# Patient Record
Sex: MALE | Race: WHITE
[De-identification: names, ages, dates, MRNs, and addresses within clinical notes are randomized per-mention and may not be internally consistent; named-entity substitution may affect disease eponyms.]

---

## 2023-04-19 ENCOUNTER — HOSPITAL ENCOUNTER (INPATIENT)
Dept: HOSPITAL 95 - ER | Age: 73
LOS: 7 days | Discharge: HOME | DRG: 329 | End: 2023-04-26
Attending: INTERNAL MEDICINE | Admitting: HOSPITALIST
Payer: MEDICARE

## 2023-04-19 VITALS — WEIGHT: 209 LBS | HEIGHT: 69 IN | BODY MASS INDEX: 30.96 KG/M2

## 2023-04-19 DIAGNOSIS — K66.1: ICD-10-CM

## 2023-04-19 DIAGNOSIS — K56.50: Primary | ICD-10-CM

## 2023-04-19 DIAGNOSIS — Z98.890: ICD-10-CM

## 2023-04-19 DIAGNOSIS — E78.5: ICD-10-CM

## 2023-04-19 DIAGNOSIS — I10: ICD-10-CM

## 2023-04-19 DIAGNOSIS — R33.8: ICD-10-CM

## 2023-04-19 DIAGNOSIS — N40.1: ICD-10-CM

## 2023-04-19 DIAGNOSIS — Z86.19: ICD-10-CM

## 2023-04-19 DIAGNOSIS — Z79.890: ICD-10-CM

## 2023-04-19 DIAGNOSIS — Z79.899: ICD-10-CM

## 2023-04-19 DIAGNOSIS — E03.9: ICD-10-CM

## 2023-04-19 LAB
ALBUMIN SERPL BCP-MCNC: 4.3 G/DL (ref 3.4–5)
ALBUMIN/GLOB SERPL: 1.2 {RATIO} (ref 0.8–1.8)
ALT SERPL W P-5'-P-CCNC: 36 U/L (ref 12–78)
ANION GAP SERPL CALCULATED.4IONS-SCNC: 3 MMOL/L (ref 6–16)
AST SERPL W P-5'-P-CCNC: 41 U/L (ref 12–37)
BASOPHILS # BLD AUTO: 0.04 K/MM3 (ref 0–0.23)
BASOPHILS NFR BLD AUTO: 0 % (ref 0–2)
BILIRUB SERPL-MCNC: 1.1 MG/DL (ref 0.1–1)
BUN SERPL-MCNC: 17 MG/DL (ref 8–24)
CALCIUM SERPL-MCNC: 9.5 MG/DL (ref 8.5–10.1)
CHLORIDE SERPL-SCNC: 107 MMOL/L (ref 98–108)
CO2 SERPL-SCNC: 26 MMOL/L (ref 21–32)
CREAT SERPL-MCNC: 0.84 MG/DL (ref 0.6–1.2)
DEPRECATED RDW RBC AUTO: 39.8 FL (ref 35.1–46.3)
EOSINOPHIL # BLD AUTO: 0 K/MM3 (ref 0–0.68)
EOSINOPHIL NFR BLD AUTO: 0 % (ref 0–6)
ERYTHROCYTE [DISTWIDTH] IN BLOOD BY AUTOMATED COUNT: 12 % (ref 11.7–14.2)
GLOBULIN SER CALC-MCNC: 3.6 G/DL (ref 2.2–4)
GLUCOSE SERPL-MCNC: 151 MG/DL (ref 70–99)
HCT VFR BLD AUTO: 50.5 % (ref 37–53)
HGB BLD-MCNC: 17.3 G/DL (ref 13.5–17.5)
IMM GRANULOCYTES # BLD AUTO: 0.04 K/MM3 (ref 0–0.1)
IMM GRANULOCYTES NFR BLD AUTO: 0 % (ref 0–1)
LYMPHOCYTES # BLD AUTO: 0.78 K/MM3 (ref 0.84–5.2)
LYMPHOCYTES NFR BLD AUTO: 7 % (ref 21–46)
MCHC RBC AUTO-ENTMCNC: 34.3 G/DL (ref 31.5–36.5)
MCV RBC AUTO: 91 FL (ref 80–100)
MONOCYTES # BLD AUTO: 0.53 K/MM3 (ref 0.16–1.47)
MONOCYTES NFR BLD AUTO: 4 % (ref 4–13)
NEUTROPHILS # BLD AUTO: 10.64 K/MM3 (ref 1.96–9.15)
NEUTROPHILS NFR BLD AUTO: 89 % (ref 41–73)
NRBC # BLD AUTO: 0 K/MM3 (ref 0–0.02)
NRBC BLD AUTO-RTO: 0 /100 WBC (ref 0–0.2)
PLATELET # BLD AUTO: 206 K/MM3 (ref 150–400)
POTASSIUM SERPL-SCNC: 4.8 MMOL/L (ref 3.5–5.5)
PROT SERPL-MCNC: 7.9 G/DL (ref 6.4–8.2)
SODIUM SERPL-SCNC: 136 MMOL/L (ref 136–145)

## 2023-04-19 PROCEDURE — A9270 NON-COVERED ITEM OR SERVICE: HCPCS

## 2023-04-20 VITALS — DIASTOLIC BLOOD PRESSURE: 68 MMHG | SYSTOLIC BLOOD PRESSURE: 145 MMHG

## 2023-04-20 VITALS — DIASTOLIC BLOOD PRESSURE: 83 MMHG | SYSTOLIC BLOOD PRESSURE: 155 MMHG

## 2023-04-20 VITALS — DIASTOLIC BLOOD PRESSURE: 69 MMHG | SYSTOLIC BLOOD PRESSURE: 151 MMHG

## 2023-04-20 VITALS — SYSTOLIC BLOOD PRESSURE: 134 MMHG | DIASTOLIC BLOOD PRESSURE: 81 MMHG

## 2023-04-20 VITALS — SYSTOLIC BLOOD PRESSURE: 145 MMHG | DIASTOLIC BLOOD PRESSURE: 80 MMHG

## 2023-04-20 VITALS — SYSTOLIC BLOOD PRESSURE: 145 MMHG | DIASTOLIC BLOOD PRESSURE: 73 MMHG

## 2023-04-20 VITALS — DIASTOLIC BLOOD PRESSURE: 73 MMHG | SYSTOLIC BLOOD PRESSURE: 151 MMHG

## 2023-04-20 VITALS — DIASTOLIC BLOOD PRESSURE: 55 MMHG | SYSTOLIC BLOOD PRESSURE: 109 MMHG

## 2023-04-20 VITALS — SYSTOLIC BLOOD PRESSURE: 141 MMHG | DIASTOLIC BLOOD PRESSURE: 68 MMHG

## 2023-04-20 VITALS — SYSTOLIC BLOOD PRESSURE: 157 MMHG | DIASTOLIC BLOOD PRESSURE: 76 MMHG

## 2023-04-20 VITALS — DIASTOLIC BLOOD PRESSURE: 72 MMHG | SYSTOLIC BLOOD PRESSURE: 146 MMHG

## 2023-04-20 VITALS — SYSTOLIC BLOOD PRESSURE: 145 MMHG | DIASTOLIC BLOOD PRESSURE: 68 MMHG

## 2023-04-20 VITALS — DIASTOLIC BLOOD PRESSURE: 74 MMHG | SYSTOLIC BLOOD PRESSURE: 133 MMHG

## 2023-04-20 VITALS — DIASTOLIC BLOOD PRESSURE: 71 MMHG | SYSTOLIC BLOOD PRESSURE: 145 MMHG

## 2023-04-20 VITALS — SYSTOLIC BLOOD PRESSURE: 142 MMHG | DIASTOLIC BLOOD PRESSURE: 86 MMHG

## 2023-04-20 VITALS — SYSTOLIC BLOOD PRESSURE: 135 MMHG | DIASTOLIC BLOOD PRESSURE: 65 MMHG

## 2023-04-20 PROCEDURE — 0DB80ZZ EXCISION OF SMALL INTESTINE, OPEN APPROACH: ICD-10-PCS | Performed by: SURGERY

## 2023-04-20 NOTE — NUR
PATIENT IS A NEW ADMIT FROM THE ED. AXOX 4 AND INDEPENDENT TRANSFER FROM
[facility] TO BED. NPO. NG TUBE PLACED IN ED AND HOOKED UP TO SUCTION. REPORTS
TOLERABLE ABDOMINAL PAIN 3/10. DENIES CHEST PAIN, SOB, AND N/V. ON ROOM AIR.
VSS/AFEBRILE. SPOUSE PRESENT ON ADMIT AND REQUEST TO STAY NOT WANTING TO DRIVE
IN DARK. BLANKET AND PILLOW PROVIDED. PATIENT ORIENTED TO ROOM AND CALL LIGHT
SYSTEM. NS STARTED INFUSING  mL/HR. CALL LIGHT IN REACH. BED IN LOWEST
POSITION. WCTM.

## 2023-04-20 NOTE — NUR
SURGERY
DR ROJSA HERE. PT VERBALLY CONSNETED TO SURGERY. WIFE ARRIVED. BLOOD CONSENT
SIGNED. FARIDEH,,JAHAIRA RM FROM DAY SURGERY HERE TO TRANSPORT PT TO SURGERY. CONTINUE
POC.

## 2023-04-20 NOTE — NUR
PT ARRIVED FROM PACU, A&OX4, VSS/2LNC, SCOOTED FROM GURNEY TO BED W/O ASSIST,
DENIES PAIN, DENIES N&V, WIFE AT BEDSIDE.

## 2023-04-20 NOTE — NUR
SHIFT SUMMARY
PT A&OX4, VSS/2L-TCDB & I.S. EDU/ENC/DEMONSTRATED. KATY PO, ICE CHIPS 60 MLS
POSTOP. PAIN MANAGED NORCO 5 MG X1. VOIDED 100 MLS. IVF @ 125 MLS/HR.
S/P EXP LAP, STACI, SB RESECTION, JASMEET WNL. WILL REPORT TO ONCOMING NOC RN.

## 2023-04-21 VITALS — DIASTOLIC BLOOD PRESSURE: 74 MMHG | SYSTOLIC BLOOD PRESSURE: 151 MMHG

## 2023-04-21 VITALS — DIASTOLIC BLOOD PRESSURE: 69 MMHG | SYSTOLIC BLOOD PRESSURE: 153 MMHG

## 2023-04-21 VITALS — SYSTOLIC BLOOD PRESSURE: 159 MMHG | DIASTOLIC BLOOD PRESSURE: 75 MMHG

## 2023-04-21 VITALS — DIASTOLIC BLOOD PRESSURE: 69 MMHG | SYSTOLIC BLOOD PRESSURE: 155 MMHG

## 2023-04-21 LAB
ANION GAP SERPL CALCULATED.4IONS-SCNC: 2 MMOL/L (ref 6–16)
BUN SERPL-MCNC: 27 MG/DL (ref 8–24)
CALCIUM SERPL-MCNC: 8.3 MG/DL (ref 8.5–10.1)
CHLORIDE SERPL-SCNC: 112 MMOL/L (ref 98–108)
CO2 SERPL-SCNC: 25 MMOL/L (ref 21–32)
CREAT SERPL-MCNC: 0.93 MG/DL (ref 0.6–1.2)
DEPRECATED RDW RBC AUTO: 43.8 FL (ref 35.1–46.3)
ERYTHROCYTE [DISTWIDTH] IN BLOOD BY AUTOMATED COUNT: 12.6 % (ref 11.7–14.2)
GLUCOSE SERPL-MCNC: 132 MG/DL (ref 70–99)
HCT VFR BLD AUTO: 44.6 % (ref 37–53)
HGB BLD-MCNC: 14.9 G/DL (ref 13.5–17.5)
MCHC RBC AUTO-ENTMCNC: 33.4 G/DL (ref 31.5–36.5)
MCV RBC AUTO: 94 FL (ref 80–100)
NRBC # BLD AUTO: 0 K/MM3 (ref 0–0.02)
NRBC BLD AUTO-RTO: 0 /100 WBC (ref 0–0.2)
PLATELET # BLD AUTO: 172 K/MM3 (ref 150–400)
POTASSIUM SERPL-SCNC: 4.4 MMOL/L (ref 3.5–5.5)
SODIUM SERPL-SCNC: 139 MMOL/L (ref 136–145)

## 2023-04-21 NOTE — NUR
SUMMARY
INDEPENDENT IN ROOM, AMBULATED DOWN THE HALLS X3 TODAY, TOLERATED WELL, DENIES
PASSING ANY FLATUS TODAY, MEDICATED ONCE FOR PAIN W/ 1 NORCO, NO C/O NAUSEA,
TOLERATING SIPS OF WATER AND ICE CHIPS WELL, NO ACUTE CHANGES THIS SHIFT.

## 2023-04-21 NOTE — NUR
SHIFT SUMMARY
POD1 EX LAP, JASMEET DRESSING TO MIDLINE ABD, SHOWS SLIGHT SHADOWING IN CENTER.
PATIENT IS AOX4, SBA IN ROOM. VOIDING IN URINAL. REPORTS NO FLATUS OF BM.
HAVING MILD PAIN MEDICATED PER EMAR. TOLERATES WELL. VITALS SIGNS WNL. CALL
LIGHT IN REACH. WILL REPORT TO DAY RN.

## 2023-04-21 NOTE — NUR
DENIES ANY NEED FOR PAIN MEDS AT THIS TIME, DENIES ANY NAUSEA, TOLERATING SIPS
AND ICE CHIPS WELL, AMBULATING INDEPENDENTLY, USING IS, DENIES PASSING FLATUS,
CONT. TO MONITOR FOR ANY CHANGES.

## 2023-04-22 VITALS — SYSTOLIC BLOOD PRESSURE: 146 MMHG | DIASTOLIC BLOOD PRESSURE: 67 MMHG

## 2023-04-22 VITALS — SYSTOLIC BLOOD PRESSURE: 159 MMHG | DIASTOLIC BLOOD PRESSURE: 78 MMHG

## 2023-04-22 VITALS — DIASTOLIC BLOOD PRESSURE: 72 MMHG | SYSTOLIC BLOOD PRESSURE: 145 MMHG

## 2023-04-22 VITALS — DIASTOLIC BLOOD PRESSURE: 78 MMHG | SYSTOLIC BLOOD PRESSURE: 152 MMHG

## 2023-04-22 LAB
BASOPHILS # BLD AUTO: 0.03 K/MM3 (ref 0–0.23)
BASOPHILS NFR BLD AUTO: 0 % (ref 0–2)
DEPRECATED RDW RBC AUTO: 43.1 FL (ref 35.1–46.3)
EOSINOPHIL # BLD AUTO: 0.08 K/MM3 (ref 0–0.68)
EOSINOPHIL NFR BLD AUTO: 1 % (ref 0–6)
ERYTHROCYTE [DISTWIDTH] IN BLOOD BY AUTOMATED COUNT: 12.3 % (ref 11.7–14.2)
HCT VFR BLD AUTO: 41.5 % (ref 37–53)
HGB BLD-MCNC: 13.9 G/DL (ref 13.5–17.5)
IMM GRANULOCYTES # BLD AUTO: 0.02 K/MM3 (ref 0–0.1)
IMM GRANULOCYTES NFR BLD AUTO: 0 % (ref 0–1)
LYMPHOCYTES # BLD AUTO: 0.77 K/MM3 (ref 0.84–5.2)
LYMPHOCYTES NFR BLD AUTO: 10 % (ref 21–46)
MCHC RBC AUTO-ENTMCNC: 33.5 G/DL (ref 31.5–36.5)
MCV RBC AUTO: 95 FL (ref 80–100)
MONOCYTES # BLD AUTO: 1.05 K/MM3 (ref 0.16–1.47)
MONOCYTES NFR BLD AUTO: 13 % (ref 4–13)
NEUTROPHILS # BLD AUTO: 6.19 K/MM3 (ref 1.96–9.15)
NEUTROPHILS NFR BLD AUTO: 76 % (ref 41–73)
NRBC # BLD AUTO: 0 K/MM3 (ref 0–0.02)
NRBC BLD AUTO-RTO: 0 /100 WBC (ref 0–0.2)
PLATELET # BLD AUTO: 147 K/MM3 (ref 150–400)

## 2023-04-22 NOTE — NUR
SHIFT SUMMARY
PATIENT IS AOX4, IND. IN ROOM. POD2 EX LAP, MIDLINE WITH JASMEET AND SHADOWING TO
THE CENTER OF DRESSING. REPORTS NO FLATUS HYPOACTIVE BOWEL TONES. PATIENT
USING I.S. AT BEDSIDE. PAIN MANAGED WELL. VOIDS USING URINAL.
RESTS T/O NIGHT. VSS, CALL LIGHT IS IN REACH. WILL REPORT TO DAY RN.

## 2023-04-22 NOTE — NUR
RN TO ROOM AT APROX 0745 FOR ASSESSMENT. PT APPEARS TO BE RESTING COMFORTABLY
AT THIS TIME. PT REPORTS FLATUS THIS EVENING. ABD DISTENDED, BT PRESENT. PT
STATES HE HAS BEEN AMBULATING T/O THE DAY. DENIES NEED FOR PAIN MEDICATION,
REPORTS PAIN 2/10 AND TOLERABLE. PICCO DRESSING TO MIDLINE INCISION C/D/I,
COMPRESSED.

## 2023-04-22 NOTE — NUR
SHIFT SUMMARY
POD 2 EX LAP
A&O X4, PT IND IN ROOM AND WALKING THE HALLS. MIDLINE INCISION, JASMEET DRESSING
HAS MILD DRAINAGE, GREEEN LIGHT FLASHING. ADVANCED TO FULL LIQUID DIET TODAY,
TOLERATING WELL. PT HAS MILD DISTENTION AND NO GAS, OR BOWEL MOVEMENT TODAY.
PT HAS BEEN TOLERATING PAIN WELL. MEDICATED PER EMAR AFTER WALK.

## 2023-04-23 VITALS — SYSTOLIC BLOOD PRESSURE: 151 MMHG | DIASTOLIC BLOOD PRESSURE: 65 MMHG

## 2023-04-23 VITALS — DIASTOLIC BLOOD PRESSURE: 67 MMHG | SYSTOLIC BLOOD PRESSURE: 142 MMHG

## 2023-04-23 VITALS — SYSTOLIC BLOOD PRESSURE: 139 MMHG | DIASTOLIC BLOOD PRESSURE: 72 MMHG

## 2023-04-23 VITALS — SYSTOLIC BLOOD PRESSURE: 131 MMHG | DIASTOLIC BLOOD PRESSURE: 59 MMHG

## 2023-04-23 NOTE — NUR
SHIFT SUMMARY
PT HAD ONE SMALL EMESIS FOLLOWING ADMINISTRATION OF 2100 MEDICATIONS, REPORTED
NAUSEA RESOLVED FOLLOWING. PT REPORTS FLATUS, ABD DISTENTION DECREASED
COMPARED TO BEGINING OF SHIFT.  HAS APPEARED TO SLEEP COMFORTABLY T/O REST OF
SHIFT. NO REPORTED UNTOLERABLE PAIN.

## 2023-04-23 NOTE — NUR
SHIFT SUMMARY
POD 3 EX LAP
PT A&O X4, AMBULATING THE HALLS WITH WIFE. MIDLINE INCISION, JASMEET DRESSING IN
PLACE. PT PASSING GAS. NO BOWEL MOVEMENT. PT DENIES ANY PAIN. TOLERATING DIET
WELL.

## 2023-04-24 VITALS — DIASTOLIC BLOOD PRESSURE: 63 MMHG | SYSTOLIC BLOOD PRESSURE: 142 MMHG

## 2023-04-24 VITALS — SYSTOLIC BLOOD PRESSURE: 136 MMHG | DIASTOLIC BLOOD PRESSURE: 64 MMHG

## 2023-04-24 VITALS — DIASTOLIC BLOOD PRESSURE: 65 MMHG | SYSTOLIC BLOOD PRESSURE: 143 MMHG

## 2023-04-24 VITALS — SYSTOLIC BLOOD PRESSURE: 141 MMHG | DIASTOLIC BLOOD PRESSURE: 63 MMHG

## 2023-04-24 NOTE — NUR
SHIFT SUMMARY
PT POD 3 EX LAP. MIDLINE INCISION W/JASMEET DRESSING WITH SCANT AMT SS DRAINAGE
PRESENT. PT HAS DENIED PAIN T/O SHIFT. APPEARS TO HAVE RESTED COMFORTABLY T/O
SHIFT. TOLERATING FULL LIQUIDS WITH NO N/V.

## 2023-04-24 NOTE — NUR
SUMMARY
DENIED ANY NEED FOR PAIN MEDS TODAY, STARTED ON REGULAR DIET FOR LUNCH,
TOLERATED WELL, DENIES ANY NAUSEA, PASSING FLATUS, NO BM TODAY, PT AMBULAED X4
DOWN THE HALLS, TOOK A SHOWER, OOB TO CHAIR, NO ACUTE CHANGES THIS SHIFT.

## 2023-04-24 NOTE — NUR
A&OX4, DENIES ANY NEED FOR PAIN MEDS AT THIS TIME, ABD SOFT, REPORTS PASSING
FLATUS, AND TOLERATING DIET WELL, CONT. TO MONITOR FOR ANY CHANGES, ENCOURAGED
TO AMBULATE, MEDICATE FOR PAIN PRN.

## 2023-04-25 VITALS — DIASTOLIC BLOOD PRESSURE: 61 MMHG | SYSTOLIC BLOOD PRESSURE: 123 MMHG

## 2023-04-25 VITALS — DIASTOLIC BLOOD PRESSURE: 72 MMHG | SYSTOLIC BLOOD PRESSURE: 132 MMHG

## 2023-04-25 VITALS — SYSTOLIC BLOOD PRESSURE: 158 MMHG | DIASTOLIC BLOOD PRESSURE: 67 MMHG

## 2023-04-25 VITALS — SYSTOLIC BLOOD PRESSURE: 143 MMHG | DIASTOLIC BLOOD PRESSURE: 77 MMHG

## 2023-04-25 LAB
ANION GAP SERPL CALCULATED.4IONS-SCNC: 1 MMOL/L (ref 6–16)
BUN SERPL-MCNC: 18 MG/DL (ref 8–24)
CALCIUM SERPL-MCNC: 8.7 MG/DL (ref 8.5–10.1)
CHLORIDE SERPL-SCNC: 108 MMOL/L (ref 98–108)
CO2 SERPL-SCNC: 28 MMOL/L (ref 21–32)
CREAT SERPL-MCNC: 0.89 MG/DL (ref 0.6–1.2)
DEPRECATED RDW RBC AUTO: 41 FL (ref 35.1–46.3)
ERYTHROCYTE [DISTWIDTH] IN BLOOD BY AUTOMATED COUNT: 12 % (ref 11.7–14.2)
GLUCOSE SERPL-MCNC: 117 MG/DL (ref 70–99)
HCT VFR BLD AUTO: 36.7 % (ref 37–53)
HGB BLD-MCNC: 12.3 G/DL (ref 13.5–17.5)
MCHC RBC AUTO-ENTMCNC: 33.5 G/DL (ref 31.5–36.5)
MCV RBC AUTO: 94 FL (ref 80–100)
NRBC # BLD AUTO: 0 K/MM3 (ref 0–0.02)
NRBC BLD AUTO-RTO: 0 /100 WBC (ref 0–0.2)
PLATELET # BLD AUTO: 184 K/MM3 (ref 150–400)
POTASSIUM SERPL-SCNC: 3.9 MMOL/L (ref 3.5–5.5)
SODIUM SERPL-SCNC: 137 MMOL/L (ref 136–145)

## 2023-04-25 NOTE — NUR
PT AWAKE DURING SHIFT REPORT. PT HOPING TO GO HOME TODAY. UP INDEPENDENTLY IN
RM AND WALKING HALLS MULTIPLE TIMES. PER PT AND REPORT, PT IS PASSING GAS, BUT
NO BM AS OF THIS AM. PT EATING AND DRINKING WELL, TOLERATING PO W/O
DIFFICULTY. NO C/O PAIN. DR MUÑOZ IN TO SEE PT AND DISCUSS PLAN OF CARE. SX TO
SEE PT AND CLEAR HIM FOR D/C HOME. REPORT GIVEN TO ANA MARIA WADE.

## 2023-04-25 NOTE — NUR
POD 4 S/P EX LAP+RESECTION. PT VSS T/O NIGHT. JASMEET DRESSING CDI; SEAL AND SX
INTACT. ABD MILDLY DISTENDED, SOFT TO PALP, PT DENIED N/V, REP +FLATUS, NO BM
YE, IS VOIDING URINE W/O DIFFICULTY. PT AMB INDEP IN ROOM, KATY WELL.

## 2023-04-25 NOTE — NUR
SHIFT SUMMARY
ALERT, ORIENTED, INDEPENDENT. FREQUENT WALKS IN HALLS WITH SPOUSE. MIDLINE ABD
INCISION CHANGED FROM JASMEET TO MEDIPORE BY DR ROJAS. REPORTS PAIN TOLERABLE.
TOLERATING REGULAR DIET AND LIQUIDS. BLADDER RETENTION TOWARD END OF SHIFT.
UNABLE TO VOID. BLADDER SCAN VOLUME 700. STRAIGHT CATH 750 OUT. REPORT GIVEN
TO NIGHT SHIFT RN.

## 2023-04-26 VITALS — DIASTOLIC BLOOD PRESSURE: 74 MMHG | SYSTOLIC BLOOD PRESSURE: 149 MMHG

## 2023-04-26 VITALS — DIASTOLIC BLOOD PRESSURE: 69 MMHG | SYSTOLIC BLOOD PRESSURE: 141 MMHG

## 2023-04-26 LAB
RBC #/AREA URNS HPF: (no result) /HPF (ref 0–2)
SP GR SPEC: 1.02 (ref 1–1.02)
UROBILINOGEN UR STRIP-MCNC: (no result) MG/DL
WBC #/AREA URNS HPF: (no result) /HPF (ref 0–5)

## 2023-04-26 NOTE — NUR
POD 5 S/P EX LAP+RESECTION. PT VSS T/O NIGHT. MEDIPORE DRESSING W/SMALL AMT SS
DRNG. PT DENIED PAIN, N/V. ABD SOFT, MILDLY DISTENDED, PT PASSING FLATUS AND
LIQ BM, REP STOOL BECOMING MORE SOLID THIS AM. PT IS VOIDING URINE W/O
DIFFICULTY. PT AMB INDEP IN ROOM, IS EAGER TO D/C HOME.

## 2023-04-26 NOTE — NUR
DISCHARGE SUMMARY
ALERT, ORIENTED, AND INDEPENDENT IN ROOM. AMBULATING IN HALLS. TOLERATING
REGULAR DIET AND LIQUIDS. REPORTS PASSING GAS AND MULTIPLE SOFT/LIQUID BMS.
VOIDING WELL THIS SHIFT, DENIES SENSATIONS OF RETENTION. UA UNREMARKABLE.
MIDLINES ABD INCISION WNL, MEDIPORE INTACT WITH SCANT SS SHADOWING. REPORTS
PAIN LOW AND TOLERABLE. DECLINES PAIN MEDICATION. DISCHARGE ORDER PLACED BY DR MATHUR. DISCHARGE EDUCATION GIVEN ON DIET, ACTIVITY, INCISION CARE, WHEN TO
CALL OR RETURN TO ER FOR URINARY SYMPTOMS OR INFECTION, FOLLOW UP APPTS WITH
PCP AND DR ROJAS. RX FAXED TO [facility] ON [address]. IV DC'D WNL. PATIENT
LEFT UNIT AT 1430 VIA WHEELCHAIR WITH SPOUSE FOR HOME.